# Patient Record
Sex: MALE | Race: WHITE | NOT HISPANIC OR LATINO | URBAN - METROPOLITAN AREA
[De-identification: names, ages, dates, MRNs, and addresses within clinical notes are randomized per-mention and may not be internally consistent; named-entity substitution may affect disease eponyms.]

---

## 2017-01-23 ENCOUNTER — OFFICE VISIT - HEALTHEAST (OUTPATIENT)
Dept: CARDIOLOGY | Facility: CLINIC | Age: 67
End: 2017-01-23

## 2017-01-23 DIAGNOSIS — I25.119 CORONARY ARTERY DISEASE INVOLVING NATIVE CORONARY ARTERY OF NATIVE HEART WITH ANGINA PECTORIS (H): ICD-10-CM

## 2017-01-23 ASSESSMENT — MIFFLIN-ST. JEOR: SCORE: 1588.39

## 2018-01-12 ENCOUNTER — COMMUNICATION - HEALTHEAST (OUTPATIENT)
Dept: ADMINISTRATIVE | Facility: CLINIC | Age: 68
End: 2018-01-12

## 2018-03-27 ENCOUNTER — OFFICE VISIT - HEALTHEAST (OUTPATIENT)
Dept: CARDIOLOGY | Facility: CLINIC | Age: 68
End: 2018-03-27

## 2018-03-27 DIAGNOSIS — I25.119 CORONARY ARTERY DISEASE INVOLVING NATIVE CORONARY ARTERY OF NATIVE HEART WITH ANGINA PECTORIS (H): ICD-10-CM

## 2018-03-27 ASSESSMENT — MIFFLIN-ST. JEOR: SCORE: 1592.93

## 2019-02-07 ENCOUNTER — COMMUNICATION - HEALTHEAST (OUTPATIENT)
Dept: ADMINISTRATIVE | Facility: CLINIC | Age: 69
End: 2019-02-07

## 2021-05-30 VITALS — WEIGHT: 190 LBS | HEIGHT: 68 IN | BODY MASS INDEX: 28.79 KG/M2

## 2021-06-01 VITALS — HEIGHT: 68 IN | BODY MASS INDEX: 28.95 KG/M2 | WEIGHT: 191 LBS

## 2021-06-08 NOTE — PROGRESS NOTES
"Cardiology Progress Note    Assessment:  Coronary artery disease status post LAD stenting in 2011 and RCA stenting in November 2015, no angina at present time  History of CVA      Plan:  We discussed long-term dual antiplatelet therapy.  The patient does not want to take Effient.  Continue healthy lifestyle.  The patient is unwilling to take lipid-lowering medications.  He prefers \"natural remedies \".  Follow-up in 1 year    Subjective:   This is 66 y.o. male who comes in today for follow-up visit.  He reports no new chest symptoms.  He has not had heart palpitations or syncope.  He bruises easily.    Review of Systems:   General: WNL  Eyes: WNL  Ears/Nose/Throat: WNL  Lungs: WNL  Heart: WNL  Stomach: WNL  Bladder: WNL  Muscle/Joints: WNL  Skin: WNL  Nervous System: WNL  Mental Health: WNL     Blood: WNL    Objective:   Visit Vitals     /74 (Patient Site: Right Arm, Patient Position: Sitting, Cuff Size: Adult Large)     Pulse 64     Resp 18     Ht 5' 7.5\" (1.715 m)     Wt 190 lb (86.2 kg)     BMI 29.32 kg/m2     Physical Exam:  GENERAL: no distress  NECK: No JVD  LUNGS: Clear to auscultation.  CARDIAC: regular rhythm, S1 & S2 normal.  No heaves, thrills, gallops or murmurs.  ABDOMEN: flat, negative hepatosplenomegaly, soft and non-tender.  EXTREMITIES: No evidence of cyanosis, clubbing or edema.    Current Outpatient Prescriptions   Medication Sig Dispense Refill     ascorbic acid (VITAMIN C) 500 mg/5 mL Syrp syrup Take 1,000 mg by mouth 2 (two) times a day.       aspirin 81 mg chewable tablet Chew 81 mg daily.       CAPSICUM, CAYENNE, ORAL Take by mouth.       CHOLECALCIFEROL, VITAMIN D3, (VITAMIN D3 ORAL) Take by mouth.       CINNAMON BARK (CINNAMON ORAL) Take by mouth.       co-enzyme Q-10 50 mg capsule Take 100 mg by mouth 2 (two) times a day.       cyanocobalamin, vitamin B-12, 1,000 mcg Subl Place 1,000 mcg under the tongue daily. 2-1/2 daily       GARLIC ORAL Take by mouth 2 (two) times a day.        " KRILL OIL ORAL Take by mouth.       Lactobacillus rhamnosus GG (CULTURELLE) 10-15 Billion cell capsule Take 1 capsule by mouth daily.       MAGNESIUM GLYCINATE ORAL Take 400 mg by mouth 2 (two) times a day.       red yeast rice 600 mg Tab Take by mouth 2 (two) times a day.       saw palmetto 160 mg cap Take 320 mg by mouth daily.       TURMERIC, BULK, MISC Use As Directed.       UNABLE TO FIND Med Name: [facility] by [employer]       No current facility-administered medications for this visit.        Cardiographics:    Stress echo: November 2015 Good exercise tolerance.  ECG was nondiagnostic due to development of RBBB at higher heart rate.  No segmental wall motion abnormalities were detected but apical segments  were not well visualized.     Coronary angio: November 2015 The right coronary artery exhibits severe disease in the  proximal segment.  A previous stent in the proximal LAD coronary artery exhibits  no restenosis due to hyperplasia or CAD .  Successful PCI ( drug eluting stent ) of the proximal right  coronary artery.    Lab Results:       Lab Results   Component Value Date    CHOL 176 11/23/2015    CHOL 169 07/10/2014     Lab Results   Component Value Date    HDL 40 11/23/2015    HDL 34 (L) 07/10/2014     Lab Results   Component Value Date    LDLCALC 117 11/23/2015    LDLCALC 106 07/10/2014     Lab Results   Component Value Date    TRIG 95 11/23/2015    TRIG 147 07/10/2014       Dexter Khan MD (Ted)

## 2021-06-16 NOTE — PROGRESS NOTES
"Cardiology Progress Note    Assessment:    Coronary artery disease status post LAD stenting in 2011 and RCA stenting in November 2015, no angina  History of CVA    Plan:  We discussed secondary prevention of cardiac events.  I stressed the importance of proper diet and exercise.  Again he expressed desire not to take any medications.  He understands that this is not conventional way of treating coronary artery disease.  He understands the risk of not taking cholesterol medications.  He did not want to have cholesterol checked today.  Follow-up in 1 year    Subjective:   This is 67 y.o. male who comes in today for follow-up visit.  He has done well.  He has not had any chest pain or shortness of breath.  He is physically active he walks for about 5 miles every day.  He recently retired.  He is thinking of volunteering.    Review of Systems:   General: WNL  Eyes: WNL  Ears/Nose/Throat: WNL  Lungs: WNL  Heart: WNL  Stomach: WNL  Bladder: WNL  Muscle/Joints: WNL  Skin: WNL  Nervous System: WNL  Mental Health: WNL     Blood: WNL    Objective:   /66 (Patient Site: Left Arm, Patient Position: Sitting, Cuff Size: Adult Regular)  Pulse 60  Resp 18  Ht 5' 7.5\" (1.715 m)  Wt 191 lb (86.6 kg)  BMI 29.47 kg/m2  Physical Exam:  GENERAL: no distress  NECK: No JVD  LUNGS: Clear to auscultation.  CARDIAC: regular rhythm, S1 & S2 normal.  No heaves, thrills, gallops or murmurs.  ABDOMEN: flat, negative hepatosplenomegaly, soft and non-tender.  EXTREMITIES: No evidence of cyanosis, clubbing or edema.    Current Outpatient Prescriptions   Medication Sig Dispense Refill     ascorbic acid (VITAMIN C) 500 mg/5 mL Syrp syrup Take 1,000 mg by mouth 2 (two) times a day.       aspirin 81 mg chewable tablet Chew 81 mg daily.       CAPSICUM, CAYENNE, ORAL Take by mouth.       CHOLECALCIFEROL, VITAMIN D3, (VITAMIN D3 ORAL) Take by mouth.       CINNAMON BARK (CINNAMON ORAL) Take by mouth.       co-enzyme Q-10 50 mg capsule Take 100 mg " by mouth 2 (two) times a day.       cyanocobalamin, vitamin B-12, 1,000 mcg Subl Place 1,000 mcg under the tongue daily. 2-1/2 daily       GARLIC ORAL Take by mouth 2 (two) times a day.        KRILL OIL ORAL Take by mouth.       Lactobacillus rhamnosus GG (CULTURELLE) 10-15 Billion cell capsule Take 1 capsule by mouth daily.       MAGNESIUM GLYCINATE ORAL Take 400 mg by mouth 2 (two) times a day.       red yeast rice 600 mg Tab Take by mouth 2 (two) times a day.       saw palmetto 160 mg cap Take 320 mg by mouth daily.       TURMERIC, BULK, MISC Use As Directed.       UNABLE TO FIND Med Name: [employer] by [employer]       No current facility-administered medications for this visit.        Cardiographics:    Stress echo: November 2015   Good exercise tolerance.  ECG was nondiagnostic due to development of RBBB at higher heart rate.  No segmental wall motion abnormalities were detected but apical segments  were not well visualized.      Coronary angio: November 2015   The right coronary artery exhibits severe disease in the  proximal segment.  A previous stent in the proximal LAD coronary artery exhibits  no restenosis due to hyperplasia or CAD .  Successful PCI ( drug eluting stent ) of the proximal right  coronary artery.    Lab Results:       Lab Results   Component Value Date    CHOL 176 11/23/2015    CHOL 169 07/10/2014     Lab Results   Component Value Date    HDL 40 11/23/2015    HDL 34 (L) 07/10/2014     Lab Results   Component Value Date    LDLCALC 117 11/23/2015    LDLCALC 106 07/10/2014     Lab Results   Component Value Date    TRIG 95 11/23/2015    TRIG 147 07/10/2014     No components found for: CHOLHDL  No results found for: BNP    Dexter (Kameron)  MD Bill

## 2021-06-18 NOTE — LETTER
Letter by Dexter Khan MD (Ted) at      Author: Dexter Khan MD (Ted) Service: -- Author Type: --    Filed:  Encounter Date: 2/7/2019 Status: (Other)       Enrique Talavera  [address] [place] [address]      February 7, 2019      Dear Enrique,    This letter is to remind you that you will be due for your follow up appointment with Dr. Kameron Khan  . To help ensure you are in the best health possible, a regular follow-up with your cardiologist is essential.     Please call our Patient Scheduling Line at [phone] to schedule your appointment at your earliest convenience.  If you have recently scheduled an appointment, please disregard this letter.    We look forward to seeing you again. As always, we are available at the number  above for any questions or concerns you may have.      Sincerely,     The Physicians and Staff of [facility] [place]

## 2021-08-21 ENCOUNTER — HEALTH MAINTENANCE LETTER (OUTPATIENT)
Age: 71
End: 2021-08-21

## 2021-10-16 ENCOUNTER — HEALTH MAINTENANCE LETTER (OUTPATIENT)
Age: 71
End: 2021-10-16

## 2021-10-21 ENCOUNTER — MEDICAL CORRESPONDENCE (OUTPATIENT)
Dept: HEALTH INFORMATION MANAGEMENT | Facility: CLINIC | Age: 71
End: 2021-10-21

## 2021-10-21 ENCOUNTER — TRANSFERRED RECORDS (OUTPATIENT)
Dept: HEALTH INFORMATION MANAGEMENT | Facility: CLINIC | Age: 71
End: 2021-10-21

## 2022-09-25 ENCOUNTER — HEALTH MAINTENANCE LETTER (OUTPATIENT)
Age: 72
End: 2022-09-25

## 2023-10-15 ENCOUNTER — HEALTH MAINTENANCE LETTER (OUTPATIENT)
Age: 73
End: 2023-10-15